# Patient Record
Sex: MALE | Race: WHITE | NOT HISPANIC OR LATINO | Employment: FULL TIME | URBAN - METROPOLITAN AREA
[De-identification: names, ages, dates, MRNs, and addresses within clinical notes are randomized per-mention and may not be internally consistent; named-entity substitution may affect disease eponyms.]

---
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
## 2021-05-28 NOTE — PROGRESS NOTES
[facility] Well Child Check    ASSESSMENT & PLAN  Michael Parsons is a 18  y.o. 9  m.o. who has normal growth and normal development.    Diagnoses and all orders for this visit:    Encounter for routine child health examination without abnormal findings  -     Hearing Screening  - Does feel that things are going well. I did do a physical that would work for his upcoming [employer] camp, ok for them to just drop the forms off when available.     Attention deficit hyperactivity disorder (ADHD), predominantly inattentive type    Doing well generally, does not feel that the medication was helping, feels comfortable without medication at this time.   Will let me know if they want to have a refill of this, if restarting then we'll need to have him follow up for a controlled substance agreement.       Return to clinic in 1 year for a Well Child Check or sooner as needed    IMMUNIZATIONS/LABS  Immunizations were reviewed and orders were placed as appropriate.    REFERRALS  Dental:  Recommend routine dental care as appropriate.  Other:  No additional referrals were made at this time.    ANTICIPATORY GUIDANCE  Social:  Friends, Peer Pressure, Employment, Need for Privacy, Extracurricular Activities and Changes and Choices  Parenting:  Support, [place]/Dependence, Allowance, Homework, Family Time and Confidential Health Care  Nutrition:  Junk Food, Dieting and Body Image  Play and Communication:  Organized Sports, Appropriate Use of TV, Hobbies, Creative Talents and Read Books  Health:  Acne, Self-image building, Drugs, Smoking, Alcohol, Self Testicular Exam, Activity (>45 min/day), Sleep and Sun Screen  Safety:  Seat Belts, Swimming Safety, Contact Sports, Recreational vehicles, Bike/Motorcycle Helmets and Safe storage of Weapons  Sexuality:  Body Changes    HEALTH HISTORY  Do you have any concerns that you'd like to discuss today?: No concerns      He is doing well generally. He is working on his [employer]  honor for  [employer]. He is doing an adventure camp this summer in [place].       Accompanied by Father    Refills needed? No    Do you have any forms that need to be filled out? No        Do you have any significant health concerns in your family history?: No  Family History   Problem Relation Age of Onset     Gestational diabetes Mother      Hypertension Father      ADD / ADHD Sister      Since your last visit, have there been any major changes in your family, such as a move, job change, separation, divorce, or death in the family?: No  Has a lack of transportation kept you from medical appointments?: No    Home  Who lives in your home?:  Patient lives with his mother, father and two sisters.   Social History     Social History Narrative    Lives with mother Matthew, Father Lawrence, Sisters Aileen, Emanuel, Sonia and Vilma     Do you have any concerns about losing your housing?: No  Is your housing safe and comfortable?: Yes  Do you have any trouble with sleep?:  No    Education  What school do you child attend?:  [employer]   What grade are you in?:  11th  How do you perform in school (grades, behavior, attention, homework?: No concerns, patient performs well.     Eating  Do you eat regular meals including fruits and vegetables?:  yes  What are you drinking (cow's milk, water, soda, juice, sports drinks, energy drinks, etc)?: water and soda  Have you been worried that you don't have enough food?: No  Do you have concerns about your body or appearance?:  No    Activities  Do you have friends?:  yes  Do you get at least one hour of physical activity per day?:  yes  How many hours a day are you in front of a screen other than for schoolwork (computer, TV, phone)?:  2 - 3 hours   What do you do for exercise?: Patient enjoys biking and working out.   Do you have interest/participate in community activities/volunteers/school sports?:  yes, trap and swimming. Last Sports physical June 2017    MENTAL HEALTH  "SCREENING  PHQ-2 Total Score: 0 (2019  2:07 PM)    PHQ-9 Total Score: 0 (2019  2:07 PM)      VISION/HEARING  Vision: Patient is already followed by a vision specialist  Hearing:  Completed. See Results     Hearing Screening    125Hz 250Hz 500Hz 1000Hz 2000Hz 3000Hz 4000Hz 6000Hz 8000Hz   Right ear:   25 20 20  20 25    Left ear:   20 20 20  20 20        TB Risk Assessment:  The patient and/or parent/guardian answer positive to:  patient and/or parent/guardian answer 'no' to all screening TB questions    Dyslipidemia Risk Screening  Have either of your parents or any of your grandparents had a stroke or heart attack before age 55?: No  Any parents with high cholesterol or currently taking medications to treat?: No     Dental  When was the last time you saw the dentist?: 6-12 months ago   Parent/Guardian declines the fluoride varnish application today. Fluoride not applied today.    Patient Active Problem List   Diagnosis     Immunizations Delinquent     Attention deficit hyperactivity disorder (ADHD)     Controlled substance agreement signed       Drugs  Does the patient use tobacco/alcohol/drugs?:  no    Safety  Does the patient have any safety concerns (peer or home)?:  no  Does the patient use safety belts, helmets and other safety equipment?:  yes    Sex  Have you ever had sex?:  No    MEASUREMENTS  Height:  5' 9\" (1.753 m)  Weight: 151 lb 11.2 oz (68.8 kg)  BMI: Body mass index is 22.4 kg/m .  Blood Pressure: 110/70  Blood pressure percentiles are 21 % systolic and 54 % diastolic based on the 2017 AAP Clinical Practice Guideline. Blood pressure percentile targets: 90: 132/82, 95: 137/85, 95 + 12 mmH/97.    PHYSICAL EXAM  Physical Exam  General: Awake, Alert and Cooperative   Head: Normocephalic and Atraumatic   Eyes: PERRL and EOMI   ENT: Normal pearly TMs bilaterally and Oropharynx clear   Neck: Supple   Chest: Chest wall normal   Lungs: Clear to auscultation bilaterally   Heart:: Regular " rate and rhythm and no murmurs   Abdomen: Soft, nontender, nondistended and no hepatosplenomegaly   : deferred   Spine: Spine straight without curvature noted   Musculoskeletal: Moving all extremities, Normal tone and Full range of motion of the extremities   Neuro: Alert and oriented times 3, Normal tone in upper and lower extremities and Grossly normal   Skin: No rashes or lesions noted

## 2021-05-29 NOTE — TELEPHONE ENCOUNTER
Who is calling:  The patient's mom  Reason for Call:  The patient's mom is calling to check the status of the form request. The patient's mom would like a phone call back when it has been faxed.   Date of last appointment with primary care: 4/23/2019  Okay to leave a detailed message: No

## 2021-05-29 NOTE — TELEPHONE ENCOUNTER
Name of form/paperwork: Other:  Physical form for camp-  Have you been seen for this request: Yes:  4/23/19  Do we have the form: faxed today to clinic  When is form needed by: 6/11/19  How would you like the form returned: call mom when complete - she will     Patient Notified form requests are processed in 3-5 business days: Yes  (If patient needs form sooner, please note that in this message.)  Okay to leave a detailed message? Yes    Need completed by WED 6/12/19

## 2021-05-29 NOTE — TELEPHONE ENCOUNTER
Patient Returning Call  Reason for call:  form  Information relayed to patient:  Please fax to number on form.  Mother does not need hard copy. .  Please call if questions. Fax to [phone]  Patient has additional questions:  No  If YES, what are your questions/concerns:  No  Okay to leave a detailed message?: No call back needed

## 2021-05-31 VITALS — BODY MASS INDEX: 20.14 KG/M2 | WEIGHT: 136 LBS | HEIGHT: 69 IN

## 2021-05-31 VITALS — WEIGHT: 142 LBS

## 2021-05-31 VITALS — WEIGHT: 152.6 LBS

## 2021-06-01 VITALS — WEIGHT: 148.6 LBS | HEIGHT: 69 IN | BODY MASS INDEX: 22.01 KG/M2

## 2021-06-02 VITALS — WEIGHT: 160.13 LBS | BODY MASS INDEX: 23.37 KG/M2

## 2021-06-03 VITALS — HEIGHT: 69 IN | BODY MASS INDEX: 22.47 KG/M2 | WEIGHT: 151.7 LBS

## 2021-06-04 VITALS
DIASTOLIC BLOOD PRESSURE: 64 MMHG | BODY MASS INDEX: 23.08 KG/M2 | OXYGEN SATURATION: 98 % | HEIGHT: 69 IN | WEIGHT: 155.8 LBS | HEART RATE: 58 BPM | SYSTOLIC BLOOD PRESSURE: 102 MMHG

## 2021-06-05 NOTE — PROGRESS NOTES
[facility] Well Child Check    ASSESSMENT & PLAN  Michael Parsons is a 19 y.o. who has normal growth and normal development.    Diagnoses and all orders for this visit:    Encounter for routine child health examination without abnormal findings    Routine sports physical exam    Cleared for sports participation as desired, form sent to scanning    Return to clinic in 1 year for a Well Child Check or sooner as needed    IMMUNIZATIONS/LABS  Immunizations were reviewed and orders were placed as appropriate.    REFERRALS  Dental:  Recommend routine dental care as appropriate.  Other:  No additional referrals were made at this time.    ANTICIPATORY GUIDANCE  Social:  Friends, Peer Pressure, Employment, Need for Privacy and Extracurricular Activities  Parenting:  Support, [place]/Dependence, Allowance, Homework, Chores, Family Time and Confidential Health Care  Nutrition:  Junk Food, Dieting and Body Image  Play and Communication:  Organized Sports, Appropriate Use of TV, Hobbies, Creative Talents and Read Books  Health:  Acne, Self-image building, Drugs, Smoking, Alcohol, Self Testicular Exam, Activity (>45 min/day), Sleep, Sun Screen and Dental Care  Safety:  Seat Belts, Swimming Safety, Contact Sports, Recreational vehicles, Bike/Motorcycle Helmets, Safe storage of Weapons and Outdoor Safety Avoiding Sun Exposure  Sexuality:  Body Changes, Safe Sex, STD's and Contraception    HEALTH HISTORY  Do you have any concerns that you'd like to discuss today?: No concerns      He is planning on going to [employer] in the fall. He is hoping to do something with automotive. He is going to start saving for school and apartments.       Accompanied by Mother    Refills needed? No    Do you have any forms that need to be filled out? No        Do you have any significant health concerns in your family history?: No  Family History   Problem Relation Age of Onset     Gestational diabetes Mother      Hypertension Father      ADD / ADHD  Sister      Since your last visit, have there been any major changes in your family, such as a move, job change, separation, divorce, or death in the family?: No  Has a lack of transportation kept you from medical appointments?: No    Home  Who lives in your home?:  Father, mother,   Social History     Social History Narrative    Lives with mother Matthew, Father Lawrence, Sisters Aileen, Emanuel, Sonia and Vilma     Do you have any concerns about losing your housing?: No  Is your housing safe and comfortable?: Yes  Do you have any trouble with sleep?:  No    Education  What school do you child attend?: [employer]   What grade are you in?:  12th  How do you perform in school (grades, behavior, attention, homework?: No concerns, performs well.      Eating  Do you eat regular meals including fruits and vegetables?:  yes  What are you drinking (cow's milk, water, soda, juice, sports drinks, energy drinks, etc)?: soda, energy drinks and coffee  Have you been worried that you don't have enough food?: No  Do you have concerns about your body or appearance?:  No    Activities  Do you have friends?:  yes  Do you get at least one hour of physical activity per day?:  yes  How many hours a day are you in front of a screen other than for schoolwork (computer, TV, phone)?:  2  What do you do for exercise?: Patient enjoys swimming.   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 Patient works as a  and .     VISION/HEARING  Vision: Patient is already followed by a vision specialist  Hearing:  Completed. See Results    No exam data present    MENTAL HEALTH SCREENING  Social-emotional & mental health screening: No screening tool used  No concerns    TB Risk Assessment:  The patient and/or parent/guardian answer positive to:  no known risk of TB    Dyslipidemia Risk Screening  Have either of your parents or any  "of your grandparents had a stroke or heart attack before age 55?: No  Any parents with high cholesterol or currently taking medications to treat?: No     Dental  When was the last time you saw the dentist?: 6-12 months ago   Parent/Guardian declines the fluoride varnish application today. Fluoride not applied today.    Patient Active Problem List   Diagnosis     Immunizations Delinquent     Attention deficit hyperactivity disorder (ADHD)     Controlled substance agreement signed       Drugs  Does the patient use tobacco/alcohol/drugs?:  no    Safety  Does the patient have any safety concerns (peer or home)?:  no  Does the patient use safety belts, helmets and other safety equipment?:  yes    Sex  Have you ever had sex?:  Yes    MEASUREMENTS  Height:  5' 9\" (1.753 m)  Weight: 155 lb 12.8 oz (70.7 kg)  BMI: Body mass index is 23.01 kg/m .  Blood Pressure: 102/64  Blood pressure percentiles are not available for patients who are 18 years or older.    PHYSICAL EXAM  Physical Exam   Well developed, well nourished, no acute distress.  HEENT: normocephalic/atraumatic, PERRLA/EOMI, TMs: Gray, normal light reflex, no nasal discharge.  Oral mucosa: no erythema/exudate  Neck: No LAD/masses/thyromegaly  Lungs: clear bilaterally  Heart: regular rate and rhythm, no murmurs/gallops/rubs, no heart murmur present on Valsalva  Abdomen: Normal bowel sounds, soft, non-tender, non-distended, no masses, neg Be's/McBurney's, no rebound/guarding  Genital: Bilaterally descended testes, no masses, non-tender, no hernia.  No urethral discharge or erythema.  No lesions, normal phallus.  Lymphatics: no supraclavicular/axillary/epitrochlear/inguinal LAD. No edema.  Neuro: A&O x 3, CN II-XII intact, strength 5/5, reflexes symmetric, sensory intact to light touch.  Psych: Behavior appropriate, engaging.  Thought processes congruent, non-tangential.  Musculoskeletal: no gross deformities.  Skin: no rashes or lesions.     "

## 2021-06-11 NOTE — PROGRESS NOTES
Chief Complaint   Patient presents with     Sore Throat     Started this morning.       HPI    Patient is here for moderate sore throat, started this AM, with a slight cough. No fever, ear pain, nasal symptoms. Took Tylenol early this AM with some relief. No known sick contacts.   ROS: Pertinent ROS noted in HPI.     No Known Allergies    Patient Active Problem List   Diagnosis     Immunizations Delinquent     Attention deficit hyperactivity disorder (ADHD)     Controlled substance agreement signed       Family History   Problem Relation Age of Onset     Diabetes Mother      pre-diabetes     Hypertension Father        Social History     Social History     Marital status: Single     Spouse name: N/A     Number of children: N/A     Years of education: N/A     Occupational History     Not on file.     Social History Main Topics     Smoking status: Never Smoker     Smokeless tobacco: Never Used      Comment: No exposure     Alcohol use Not on file     Drug use: Not on file     Sexual activity: Not on file     Other Topics Concern     Not on file     Social History Narrative         Objective:    Vitals:    06/10/17 1438   BP: 102/58   Pulse: 75   Resp: 18   Temp: 98.2  F (36.8  C)   SpO2: 98%       Gen: NAD  Throat: minimal tonsillar erythema without edema nor exudates  Ears: normal TMs and canals  Nose: no discharge  Neck; no adenopathy  CV: RRR, no M, R, G  Pulm: CTAB, normal effort      Recent Results (from the past 24 hour(s))   Rapid Strep A Screen-Throat   Result Value Ref Range    Rapid Strep A Antigen No Group A Strep detected, presumptive negative No Group A Strep detected, presumptive negative         Sore throat  -     Rapid Strep A Screen-Throat  -     Group A Strep, RNA Direct Detection, Throat      Supportive cares as directed.

## 2021-06-11 NOTE — PROGRESS NOTES
[facility] Well Child Check    ASSESSMENT & PLAN  Michael Parsons is a 16  y.o. 10  m.o. who has normal growth and normal development.    Diagnoses and all orders for this visit:    Well adult exam  -     Hemoglobin  -     Cancel: Urinalysis  -     Urinalysis Macroscopic      Return to clinic in 1 year for a Well Child Check or sooner as needed   Mom understands that Dr. Boggs is along with the clinic and that she will need to get Michael established with another 1 of the primary care physicians here and have controlled substance agreement signed for the issue of the Concerta for his known attention deficit hyperactivity disorder.  He does not take the medication in the summer except that when he is at camp.  She will plan to have him be seen before school starts.  I filled out both forms for his sports participation physical as well as for his high adventure [employer] form.    IMMUNIZATIONS/LABS  I have discussed the risks and benefits of all of the vaccine components with the patient/parents.  All questions have been answered. and I recommended HPV vaccine but mom declined at this time.    REFERRALS  Dental:  Recommend routine dental care as appropriate.  Other:  No additional referrals were made at this time.    ANTICIPATORY GUIDANCE  I have reviewed age appropriate anticipatory guidance.    HEALTH HISTORY  Do you have any concerns that you'd like to discuss today?: No concerns       Roomed by: Gen B., CMA    Refills needed? No    Do you have any forms that need to be filled out? No        Do you have any significant health concerns in your family history?: No  Family History   Problem Relation Age of Onset     Diabetes Mother      pre-diabetes     Hypertension Father      Since your last visit, have there been any major changes in your family, such as a move, job change, separation, divorce, or death in the family?: No    Home  Who lives in your home?:  Mom, dad, siblings  Social History     Social History  Narrative     No narrative on file     Do you have any trouble with sleep?:  No    Education  What school does your child attend?:  [employer]  What grade is your child in?:  10th  How does the patient perform in school (grades, behavior, attention, homework?: good     Eating  Does patient eat regular meals including fruits and vegetables?:  yes  What is the patient drinking (cow's milk, water, soda, juice, sports drinks, energy drinks, etc)?: water, soda and sports drinks  Does patient have concerns about body or appearance?:  No    Activities  Does the patient have friends?:  yes  Does the patient get at least one hour of physical activity per day?:  yes  Does the patient have less than 2 hours of screen time per day (aside from homework)?:  yes  What does your child do for exercise?:  Swimming, walking the dog, running, mowing the lawn, weight lifting  Does the patient have interest/participate in community activities/volunteers/school sports?:  Yes, volunteers for [employer], swimming & track    MENTAL HEALTH SCREENING  PHQ-2 Total Score: 0 (6/12/2017  1:00 PM)  PHQ-2 Total Score: 0 (6/12/2017  1:00 PM)    VISION/HEARING  Vision: Completed. See Results  Hearing:  Completed. See Results     Hearing Screening    Method: Audiometry    125Hz 250Hz 500Hz 1000Hz 2000Hz 3000Hz 4000Hz 6000Hz 8000Hz   Right ear:   20 20 20  20     Left ear:   25 25 20  20        Visual Acuity Screening    Right eye Left eye Both eyes   Without correction:      With correction: 20/25 20/25 20/25       TB Risk Assessment:  The patient and/or parent/guardian answer positive to:  patient and/or parent/guardian answer 'no' to all screening TB questions    Dental  Is your child being seen by a dentist?  Yes  Is child seen by dentist?     Yes    Patient Active Problem List   Diagnosis     Immunizations Delinquent     Attention deficit hyperactivity disorder (ADHD)     Controlled substance agreement signed       Drugs  Does the  "patient use tobacco/alcohol/drugs?:  no    Safety  Does the patient have any safety concerns (peer or home)?:  no  Does the patient use safety belts, helmets and other safety equipment?:  no    Sex  Is the patient sexually active?:  no    MEASUREMENTS  Height:  5' 8.5\" (1.74 m)  Weight: 136 lb (61.7 kg)  BMI: Body mass index is 20.38 kg/(m^2).  Blood Pressure: 100/70  Blood pressure percentiles are 6 % systolic and 64 % diastolic based on NHBPEP's 4th Report. Blood pressure percentile targets: 90: 130/81, 95: 134/85, 99 + 5 mmH/98.    PHYSICAL EXAM  Physical Exam   Head normocephalic.  External ears and TMs normal except for minimal clear fluid behind TMs consistent with eustachian tube function from his current viral upper respiratory infection.  Nasal congestion consistent with his URI.  Pupils equal round and react to light and accommodation.  Oropharynx negative.  Neck supple without adenopathy or thyromegaly.  Lungs clear to auscultation.  Heart regular rate and rhythm without murmur.  Abdomen shows no masses tenderness or hepatosplenomegaly.  Genitalia normal with tender stage V development, no hernia.  Extremities unremarkable.  No cervical axillary or groin adenopathy.  No focal neurologic abnormalities.    "

## 2021-06-14 NOTE — PROGRESS NOTES
Assessment/Plan:  1. Attention deficit hyperactivity disorder (ADHD), predominantly inattentive type  -Doing well on current medication. Will f/u in six months and call sooner if issues.   -Controlled substance agreement updated today  - methylphenidate HCl (CONCERTA) 36 MG CR tablet; Take 1 tablet (36 mg total) by mouth every morning.  Dispense: 30 tablet; Refill: 0    There are no Patient Instructions on file for this visit.    SUBJECTIVE:  Michael Parsons is a 17 y.o. male present to clinic with mother to follow up on ADHD.     Pt is currently on:    Current Outpatient Prescriptions:      methylphenidate HCl (CONCERTA) 36 MG CR tablet, Take 1 tablet (36 mg total) by mouth every morning., Disp: 30 tablet, Rfl: 0     UNABLE TO FIND, daily. Med Name: Protein  Drinks Mitoplex, Disp: , Rfl:     He was last seen on June 2017    His diagnosis was made on 2012    Possible Side Effects: none  Use on weekends and holidays: uses it only on busy weekends and holidays where he may have finals.   When takes medicine: takes it at around 0700  Time that is wears off: 7th period, maybe 2 pm    Current school and grade level: 10th grade  Special classes if any: Does not have an IEP or special classes.   Peer interactions: normal  Mood: fine  Sleep:sleeping well  Swallow pills:   Drug use: denies. The [place] Prescribing Monitoring program website was checked for this patient and did not show any concerning refills.    Other concerns or comments: na    Social history:   Social History     Social History Narrative     Lives at home with Mom, Dad and siblings  Family stressors na    Family history:   Family History   Problem Relation Age of Onset     Diabetes Mother      pre-diabetes     Hypertension Father        ADHD: Inattentive  Learning problems: None  Anxiety, Bipolar, depression: None  Tic's or tourette's: None  Hypertrophic cardiomyopathy, long Qtc and sudden death: None        Past Medical History:  Past Medical History:    Diagnosis Date     Attention-deficit Hyperactivity Disorder     Created by Conversion      Immunizations Delinquent     Created by [employer] [facility] Annotation: Nov 25 2012  5:39PM - Carissa Boggs: As of Jan 23, 2013 still needs HVP series.  2nd Hep A after July 23, 2013.  Mother has  refused influenza vaccine for him.      Past Surgical History:   Procedure Laterality Date     TOOTH EXTRACTION Right        Allergies: No Known Allergies    ROS:  General: Health is good  Endocrine: Growing in height and weight well.  Cardiac: No chest pain, palpitations, or syncope, No chest pain with exercise   GI: Good appetite, no stomachaches, no nausea, no diarrhea, no emesis, no constipation  : no enuresis  Neuro: No headaches, sleep disturbance, tics, changes in mood, no changes in activity level.     Exam:  Vitals:    11/28/17 1554   BP: 122/76   Pulse: 72   SpO2: 98%   Weight: 152 lb 9.6 oz (69.2 kg)       MENTAL STATUS:  Gen: Alert, oriented. Well groomed, interacts appropriately with examiner.    Speech:No abnormal speech or flight of ideas.   Mood: euthymic.    Thought content: No abnormal thought content.  Judgment: intact  Fund of knowledge: appropriate for age.  Neck: thyroid non enlarged  Cardiovascular Exam: RRR without murmurs, clicks or gallops.  Lung Exam: Clear and equal breath sounds.  Musculoskeletal Exam: Gross survey unremarkable. Gait smooth and coordinated.         Total of 25 minutes spent with patient, > 50% spent in counseling and/or coordination of care.     Pippa Botello ....................  11/30/2017   4:15 PM

## 2021-06-17 NOTE — PATIENT INSTRUCTIONS - HE
Patient Instructions by Ppipa Botello MD at 4/23/2019  2:00 PM     Author: Pippa Botello MD Service: -- Author Type: Physician    Filed: 4/23/2019  2:40 PM Encounter Date: 4/23/2019 Status: Signed    : Pippa Botello MD (Physician)         4/23/2019  Wt Readings from Last 1 Encounters:   04/23/19 151 lb 11.2 oz (68.8 kg) (58 %, Z= 0.19)*     * Growth percentiles are based on CDC (Boys, 2-20 Years) data.       Acetaminophen Dosing Instructions  (May take every 4-6 hours)      WEIGHT   AGE Infant/Children's  160mg/5ml Children's   Chewable Tabs  80 mg each Simon Strength  Chewable Tabs  160 mg     Milliliter (ml) Soft Chew Tabs Chewable Tabs   6-11 lbs 0-3 months 1.25 ml     12-17 lbs 4-11 months 2.5 ml     18-23 lbs 12-23 months 3.75 ml     24-35 lbs 2-3 years 5 ml 2 tabs    36-47 lbs 4-5 years 7.5 ml 3 tabs    48-59 lbs 6-8 years 10 ml 4 tabs 2 tabs   60-71 lbs 9-10 years 12.5 ml 5 tabs 2.5 tabs   72-95 lbs 11 years 15 ml 6 tabs 3 tabs   96 lbs and over 12 years   4 tabs     Ibuprofen Dosing Instructions- Liquid  (May take every 6-8 hours)      WEIGHT   AGE Concentrated Drops   50 mg/1.25 ml Infant/Children's   100 mg/5ml     Dropperful Milliliter (ml)   12-17 lbs 6- 11 months 1 (1.25 ml)    18-23 lbs 12-23 months 1 1/2 (1.875 ml)    24-35 lbs 2-3 years  5 ml   36-47 lbs 4-5 years  7.5 ml   48-59 lbs 6-8 years  10 ml   60-71 lbs 9-10 years  12.5 ml   72-95 lbs 11 years  15 ml       Ibuprofen Dosing Instructions- Tablets/Caplets  (May take every 6-8 hours)    WEIGHT AGE Children's   Chewable Tabs   50 mg Simon Strength   Chewable Tabs   100 mg Simon Strength   Caplets    100 mg     Tablet Tablet Caplet   24-35 lbs 2-3 years 2 tabs     36-47 lbs 4-5 years 3 tabs     48-59 lbs 6-8 years 4 tabs 2 tabs 2 caps   60-71 lbs 9-10 years 5 tabs 2.5 tabs 2.5 caps   72-95 lbs 11 years 6 tabs 3 tabs 3 caps         Patient Education             Bright Futures Parent Handout   15 to  17 Year Visits  Here are some suggestions from [employer] experts that may be of value to your family.     Your Growing and Changing Teen    Help your teen visit the dentist at least twice a year.    Encourage your teen to protect her hearing at work, home, and concerts.    Keep a variety of healthy foods at home.    Help your teen get enough calcium.    Encourage 1 hour of vigorous physical activity a day.    Praise your teen when he does something well, not just when he looks good.  Healthy Behavior Choices    Talk with your teen about your values and your expectations on drinking, drug use, tobacco use, driving, and sex.    Be there for your teen when she needs support or help in making healthy decision about her sexual behavior.    Support safe activities at school and in the community.    Praise your teen for healthy decisions about sex, tobacco, alcohol, and other drugs. Violence and Injuries    Do not tolerate drinking and driving.    Insist that seat belts be used by everyone.    Set expectations for safe driving.    Limit the number of friends in the car, nighttime driving, and distractions.    Never allow physical harm of yourself, your teen, or others at home or school.    Remove guns from your home. If you must keep a gun in your home, make sure it is unloaded and locked with ammunition locked in a separate place.    Teach your teen how to deal with conflict without using violence.    Make sure your teen understands that healthy dating relationships are built on respect and that saying no is OK.  Feelings and Family    Set aside time to be with your teen and really listen to his hopes and concerns.    Support your teen as he figures out ways to deal with stress.    Support your teen in solving problems and making decisions.    If you are concerned that your teen is sad, depressed, nervous, irritable, hopeless, or angry, talk with me. School and Friends    Praise positive efforts and success in school  and other activities.    Encourage reading.    Help your teen find new activities she enjoys.    Encourage your teen to help others in the community.    Help your teen find and be a part of positive after-school activities and sports.    Encourage healthy friendships and fun, safe things to do with friends.    Know your teens friends and their parents, where your teen is, and what he is doing at all times.    Check in with your teens teacher about her grades on tests.    Attend back-to-school events if possible.    Attend parent-teacher conferences if possible.            Patient Education             [facility] Patient Handout   15 to 17 Year Visits     Your Daily Life    Visit the dentist at least twice a year.    Brush your teeth at least twice a day and floss once a day.    Wear your mouth guard when playing sports.    Protect your hearing at work, home, and concerts.    Try to eat healthy foods.    5 fruits and vegetables a day    3 cups of low-fat milk, yogurt, or cheese    Eating breakfast is very important.    Drink plenty of water. Choose water instead of soda.    Eat with your family often.    Aim for 1 hour of vigorous physical activity every day.    Try to limit watching TV, playing video games, or playing on the computer to 2 hours a day (outside of homework time).    Be proud of yourself when you do something good.  Healthy Behavior Choices    Talk with your parents about your values and expectations for drinking, drug use, tobacco use, driving, and sex.    Talk with your parents when you need support or help in making healthy decisions about sex.    Find safe activities at school and in the community.    Make healthy decisions about sex, tobacco, alcohol, and other drugs.    Follow your familys rules. Violence and Injuries    Do not drink and drive or ride in a vehicle with someone who has been using drugs or alcohol.    If you feel unsafe driving or riding with someone, call someone you trust to  drive you.    Support friends who choose not to use tobacco, alcohol, drugs, steroids, or diet pills.    Insist that seat belts be used by everyone.    Always be a safe and cautious .    Limit the number of friends in the car, nighttime driving, and distractions.    Never allow physical harm of yourself or others at home or school.    Learn how to deal with conflict without using violence.    Understand that healthy dating relationships are built on respect and that saying no is OK.    Fighting and carrying weapons can be dangerous.  Your Feelings    Talk with your parents about your hopes and concerns.    Figure out healthy ways to deal with stress.    Look for ways you can help out at home.    Develop ways to solve problems and make good decisions.    Its important for you to have accurate information about sexuality, your physical development, and your sexual feelings. Please ask me if you have any questions. School and Friends    Set high goals for yourself in school, your future, and other activities.    Read often.    Ask for help when you need it.    Find new activities you enjoy.    Consider volunteering and helping others in the community with an issue that interests or concerns you.    Be a part of positive after-school activities and sports.    Form healthy friendships and find fun, safe things to do with friends.    Spend time with your family and help at home.    Take responsibility for getting your homework done and getting to school or work on time.

## 2021-06-18 NOTE — PROGRESS NOTES
[facility] Well Child Check    ASSESSMENT & PLAN  Michael Parsons is a 17  y.o. 10  m.o. who has normal growth and normal development.    Diagnoses and all orders for this visit:    Routine general medical examination at a health care facility  -     Meningococcal MCV4P  -     Cancel: Hearing Screening  - Doing well. He did have a boy  camp coming up and I did fill out that paperwork as well, both for regular and high adventure camp. He has no issues with exercise. Sports physical is good until 2019.   - Mom defers HPV vaccine at this time    Attention deficit hyperactivity disorder (ADHD), predominantly inattentive type  -     methylphenidate HCl (CONCERTA) 36 MG CR tablet; Take 1 tablet (36 mg total) by mouth every morning.  Dispense: 15 tablet; Refill: 0  - Doing well, f/u in six months, filled today and updated controlled substance agreement    Controlled substance agreement signed      Return to clinic in 1 year for a Well Child Check or sooner as needed    IMMUNIZATIONS/LABS  Immunizations were reviewed and orders were placed as appropriate.    REFERRALS  Dental:  Recommend routine dental care as appropriate.  Other:  No additional referrals were made at this time.    ANTICIPATORY GUIDANCE  Social:  Friends, Peer Pressure, Employment and Need for Privacy  Parenting:  Support, [place]/Dependence, Allowance, Homework, Family Time and Confidential Health Care  Nutrition:  Junk Food and Dieting  Play and Communication:  Organized Sports, Appropriate Use of TV, Hobbies, Creative Talents and Read Books  Health:  Acne, Smoking, Activity (>45 min/day), Sleep, Sun Screen and Dental Care  Safety:  Seat Belts, Swimming Safety, Safe storage of Weapons and Outdoor Safety Avoiding Sun Exposure  Sexuality:  Body Changes    HEALTH HISTORY  Do you have any concerns that you'd like to discuss today?: No concerns      He does need a refill of his concerta. It is working well for him. He did have a sports physical in  2016. Does not get dizzy with exercise.       Accompanied by Mother    Refills needed? Yes Concerta    Do you have any forms that need to be filled out? Yes [employer] forms        Do you have any significant health concerns in your family history?: No  Family History   Problem Relation Age of Onset     Diabetes Mother      pre-diabetes     Hypertension Father      Since your last visit, have there been any major changes in your family, such as a move, job change, separation, divorce, or death in the family?: No  Has a lack of transportation kept you from medical appointments?: No    Home  Who lives in your home?:  Patient lives with his father, mother and four sisters.   Social History     Social History Narrative    Lives with mother Matthew, Father Lawrence, Sisters Aileen, Emanuel, Sonia and Vilma     Do you have any concerns about losing your housing?: No  Is your housing safe and comfortable?: Yes  Do you have any trouble with sleep?:  No    Education  What school do you child attend?: [employer]   What grade are you in?:  11th (Starting in the fall).   How do you perform in school (grades, behavior, attention, homework?: No concerns, performs well.     Eating  Do you eat regular meals including fruits and vegetables?:  yes, not on a regular basis.   What are you drinking (cow's milk, water, soda, juice, sports drinks, energy drinks, etc)?: water, soda, juice and tea.  Have you been worried that you don't have enough food?: No  Do you have concerns about your body or appearance?:  No    Activities  Do you have friends?:  yes  Do you get at least one hour of physical activity per day?:  yes  How many hours a day are you in front of a screen other than for schoolwork (computer, TV, phone)?:  2  What do you do for exercise?: Swimming, biking and walking.   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     MENTAL HEALTH  "SCREENING  PHQ-2 Total Score: 0 (2018  7:24 AM)  PHQ-9 Total Score: 0 (2018  7:24 AM)    VISION/HEARING  Vision: Patient is already followed by a vision specialist  Hearing:  Completed. See Results    No exam data present    TB Risk Assessment:  The patient and/or parent/guardian answer positive to:  patient and/or parent/guardian answer 'no' to all screening TB questions    Dyslipidemia Risk Screening  Have either of your parents or any of your grandparents had a stroke or heart attack before age 55?: No  Any parents with high cholesterol or currently taking medications to treat?: No     Dental  When was the last time you saw the dentist?: 6-12 months ago   Parent/Guardian declines the fluoride varnish application today.    Patient Active Problem List   Diagnosis     Immunizations Delinquent     Attention deficit hyperactivity disorder (ADHD)     Controlled substance agreement signed       Drugs  Does the patient use tobacco/alcohol/drugs?:  no    Safety  Does the patient have any safety concerns (peer or home)?:  no  Does the patient use safety belts, helmets and other safety equipment?:  yes    Sex  Have you ever had sex?:  No    MEASUREMENTS  Height:  5' 9.4\" (1.763 m)  Weight: 148 lb 9.6 oz (67.4 kg)  BMI: Body mass index is 21.69 kg/(m^2).  Blood Pressure: 108/66  Blood pressure percentiles are 15 % systolic and 44 % diastolic based on NHBPEP's 4th Report. Blood pressure percentile targets: 90: 133/83, 95: 137/87, 99 + 5 mmH/100.    PHYSICAL EXAM  Physical Exam   Well developed, well nourished, no acute distress.  HEENT: normocephalic/atraumatic, PERRLA/EOMI, TMs: Gray, normal light reflex, no nasal discharge.  Oral mucosa: no erythema/exudate  Neck: No LAD/masses/thyromegaly  Lungs: clear bilaterally  Heart: regular rate and rhythm, no murmurs/gallops/rubs  Abdomen: Normal bowel sounds, soft, non-tender, non-distended, no masses, neg Be's/McBurney's, no rebound/guarding  Genital: " Bilaterally descended testes, no masses, non-tender, no hernia.    Lymphatics: no supraclavicular/axillary/epitrochlear/inguinal LAD. No edema.  Neuro: A&O x 3, CN II-XII intact, strength 5/5, reflexes symmetric, sensory intact to light touch.  Psych: Behavior appropriate, engaging.  Thought processes congruent, non-tangential.  Musculoskeletal: no gross deformities.  Skin: no rashes or lesions.

## 2021-06-18 NOTE — PATIENT INSTRUCTIONS - HE
Patient Instructions by Pippa Botello MD at 2/3/2020  9:40 AM     Author: Pippa Botello MD Service: -- Author Type: Physician    Filed: 2/3/2020 10:16 AM Encounter Date: 2/3/2020 Status: Signed    : Pippa Botello MD (Physician)          Patient Education      [facility]IGHT [facility] HANDOUT- PATIENT  18 THROUGH 21 YEAR VISITS  Here are some suggestions from [employer] experts that may be of value to your family.     HOW YOU ARE DOING  Enjoy spending time with your family.  Find activities you are really interested in, such as sports, theater, or volunteering.  Try to be responsible for your schoolwork or work obligations.  Always talk through problems and never use violence.  If you get angry with someone, try to walk away.  If you feel unsafe in your home or have been hurt by someone, let us know. Hotlines and community agencies can also provide confidential help.  Talk with us if you are worried about your living or food situation. Community agencies and programs such as SNAP can help.  Dont smoke, vape, or use drugs. Avoid people who do when you can. Talk with us if you are worried about alcohol or drug use in your family.    YOUR DAILY LIFE  Visit the dentist at least twice a year.  Brush your teeth at least twice a day and floss once a day.  Be a healthy eater.  Have vegetables, fruits, lean protein, and whole grains at meals and snacks.  Limit fatty, sugary, salty foods that are low in nutrients, such as candy, chips, and ice cream.  Eat when youre hungry. Stop when you feel satisfied.  Eat breakfast.  Drink plenty of water.  Make sure to get enough calcium every day.  Have 3 or more servings of low-fat (1%) or fat-free milk and other low-fat dairy products, such as yogurt and cheese.  Women: Make sure to eat foods rich in folate, such as fortified grains and dark- green leafy vegetables.  Aim for at least 1 hour of physical activity every day.  Wear safety equipment  when you play sports.  Get enough sleep.  Talk with us about managing your health care and insurance as an adult.    YOUR FEELINGS  Most people have ups and downs. If you are feeling sad, depressed, nervous, irritable, hopeless, or angry, let us know or reach out to another health care professional.  Figure out healthy ways to deal with stress.  Try your best to solve problems and make decisions on your own.  Sexuality is an important part of your life. If you have any questions or concerns, we are here for you.    HEALTHY BEHAVIOR CHOICES  Avoid using drugs, alcohol, tobacco, steroids, and diet pills. Support friends who choose not to use.  If you use drugs or alcohol, let us know or talk with another trusted adult about it. We can help you with quitting or cutting down on your use.  Make healthy decisions about your sexual behavior.  If you are sexually active, always practice safe sex. Always use birth control along with a condom to prevent pregnancy and sexually transmitted infections.  All sexual activity should be something you want. No one should ever force or try to convince you.  Protect your hearing at work, home, and concerts. Keep your earbud volume down.    STAYING SAFE  Always be a safe and cautious .  Insist that everyone use a lap and shoulder seat belt.  Limit the number of friends in the car and avoid driving at night.  Avoid distractions. Never text or talk on the phone while you drive.  Do not ride in a vehicle with someone who has been using drugs or alcohol.  If you feel unsafe driving or riding with someone, call someone you trust to drive you.  Wear helmets and protective gear while playing sports. Wear a helmet when riding a bike, a motorcycle, or an ATV or when skiing or skateboarding.  Always use sunscreen and a hat when youre outside.  Fighting and carrying weapons can be dangerous. Talk with your parents, teachers, or doctor about how to avoid these situations.      Helpful  Resources:  National Domestic Violence Hotline: 284.989.6588   Consistent with Bright Futures: Guidelines for Health Supervision of Infants, Children, and Adolescents, 4th Edition  For more information, go to https://brightfutures.aap.org.

## 2021-06-20 NOTE — LETTER
Letter by Pippa Botello MD at      Author: Pippa Botello MD Service: -- Author Type: --    Filed:  Encounter Date: 2/3/2020 Status: (Other)         February 3, 2020     Patient: Michael Parsons   YOB: 2000   Date of Visit: 2/3/2020       To Whom it May Concern:    Michael Parsons was seen in my clinic on 2/3/2020. He will be late to school due to this.     If you have any questions or concerns, please don't hesitate to call.    Sincerely,         Electronically signed by Pippa Botello MD

## 2021-06-24 NOTE — PROGRESS NOTES
ASSESSMENT/PLAN:       1. Sore throat    - Rapid Strep A Screen- Throat Swab, negative  - Group A Strep, RNA Direct Detection, Throat    2. Acute suppurative otitis media of right ear without spontaneous rupture of tympanic membrane, recurrence not specified    - amoxicillin (AMOXIL) 875 MG tablet; Take 1 tablet (875 mg total) by mouth 2 (two) times a day for 7 days.  Dispense: 14 tablet; Refill: 0    3. Fever, unspecified fever cause  I would hope that the patient is afebrile after 24-48 hours and feeling better.  If new symptoms developed or if getting worse she will need to return.  Symptoms could be related to influenza but he does not have any cough.          Robert Hernandez MD      PROGRESS NOTE   3/4/2019    SUBJECTIVE:  Michael Parsons is a 18 y.o. male  who presents for   Chief Complaint   Patient presents with     Headache     fatigue x 3 days      2 days with headache, muscle aching and some fever.  Also has noticed some discomfort in the neck on the right side.  He denies sore throat and earache.  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  Of note is that he denies any significant ear pain or hearing loss    Patient Active Problem List   Diagnosis     Immunizations Delinquent     Attention deficit hyperactivity disorder (ADHD)     Controlled substance agreement signed       Current Outpatient Medications   Medication Sig Dispense Refill     methylphenidate HCl (CONCERTA) 36 MG CR tablet Take 1 tablet (36 mg total) by mouth every morning. 30 tablet 0     UNABLE TO FIND daily. Med Name: Protein  Drinks Mitoplex       amoxicillin (AMOXIL) 875 MG tablet Take 1 tablet (875 mg total) by mouth 2 (two) times a day for 7 days. 14 tablet 0     No current facility-administered medications for this visit.        Social History     Tobacco Use   Smoking Status Never Smoker   Smokeless Tobacco Never Used   Tobacco Comment    No exposure           OBJECTIVE:        Recent Results  (from the past 240 hour(s))   Rapid Strep A Screen- Throat Swab   Result Value Ref Range    Rapid Strep A Antigen No Group A Strep detected, presumptive negative No Group A Strep detected, presumptive negative       Vitals:    03/04/19 1043   BP: 110/58   Patient Position: Sitting   Cuff Size: Adult Large   Pulse: 91   Temp: 102.5  F (39.2  C)   SpO2: 99%   Weight: 160 lb 2 oz (72.6 kg)     Weight: 160 lb 2 oz (72.6 kg)          Physical Exam:  GENERAL APPEARANCE: 18-year-old who appears ill, well hydrated, well nourished  SKIN:  Normal skin turgor, no lesions/rashes   HEENT: moist mucous membranes, no rhinorrhea, the right tympanic membrane is erythematous with loss of landmarks and the left tympanic membrane is normal.  Pharynx is normal  NECK: Normal with 2+ anterior cervical adenopathy right side or masses  CV: RRR, no M/G/R   LUNGS: CTAB  ABDOMEN: S&NT, no masses or enlarged organs   EXTREMITY: no edema and full ROM of all joints  NEURO: no focal findings

## 2024-05-17 ENCOUNTER — OFFICE VISIT (OUTPATIENT)
Dept: FAMILY MEDICINE | Facility: CLINIC | Age: 24
End: 2024-05-17
Payer: COMMERCIAL

## 2024-05-17 VITALS
HEART RATE: 94 BPM | BODY MASS INDEX: 20.59 KG/M2 | OXYGEN SATURATION: 99 % | DIASTOLIC BLOOD PRESSURE: 78 MMHG | SYSTOLIC BLOOD PRESSURE: 118 MMHG | HEIGHT: 69 IN | WEIGHT: 139 LBS | TEMPERATURE: 98.4 F | RESPIRATION RATE: 18 BRPM

## 2024-05-17 DIAGNOSIS — Z00.00 ROUTINE GENERAL MEDICAL EXAMINATION AT A HEALTH CARE FACILITY: Primary | ICD-10-CM

## 2024-05-17 DIAGNOSIS — Z11.3 SCREENING EXAMINATION FOR STI: ICD-10-CM

## 2024-05-17 DIAGNOSIS — Z13.0 SCREENING FOR DEFICIENCY ANEMIA: ICD-10-CM

## 2024-05-17 DIAGNOSIS — Z11.4 SCREENING FOR HIV (HUMAN IMMUNODEFICIENCY VIRUS): ICD-10-CM

## 2024-05-17 DIAGNOSIS — Z11.59 NEED FOR HEPATITIS C SCREENING TEST: ICD-10-CM

## 2024-05-17 LAB
ERYTHROCYTE [DISTWIDTH] IN BLOOD BY AUTOMATED COUNT: 11.9 % (ref 10–15)
HCT VFR BLD AUTO: 42.5 % (ref 40–53)
HGB BLD-MCNC: 15.3 G/DL (ref 13.3–17.7)
MCH RBC QN AUTO: 30.8 PG (ref 26.5–33)
MCHC RBC AUTO-ENTMCNC: 36 G/DL (ref 31.5–36.5)
MCV RBC AUTO: 86 FL (ref 78–100)
PLATELET # BLD AUTO: 258 10E3/UL (ref 150–450)
RBC # BLD AUTO: 4.96 10E6/UL (ref 4.4–5.9)
WBC # BLD AUTO: 6 10E3/UL (ref 4–11)

## 2024-05-17 PROCEDURE — 90715 TDAP VACCINE 7 YRS/> IM: CPT | Performed by: NURSE PRACTITIONER

## 2024-05-17 PROCEDURE — 36415 COLL VENOUS BLD VENIPUNCTURE: CPT | Performed by: NURSE PRACTITIONER

## 2024-05-17 PROCEDURE — 99385 PREV VISIT NEW AGE 18-39: CPT | Mod: 25 | Performed by: NURSE PRACTITIONER

## 2024-05-17 PROCEDURE — 87491 CHLMYD TRACH DNA AMP PROBE: CPT | Performed by: NURSE PRACTITIONER

## 2024-05-17 PROCEDURE — 86803 HEPATITIS C AB TEST: CPT | Performed by: NURSE PRACTITIONER

## 2024-05-17 PROCEDURE — 90651 9VHPV VACCINE 2/3 DOSE IM: CPT | Performed by: NURSE PRACTITIONER

## 2024-05-17 PROCEDURE — 85027 COMPLETE CBC AUTOMATED: CPT | Performed by: NURSE PRACTITIONER

## 2024-05-17 PROCEDURE — 90472 IMMUNIZATION ADMIN EACH ADD: CPT | Performed by: NURSE PRACTITIONER

## 2024-05-17 PROCEDURE — 87591 N.GONORRHOEAE DNA AMP PROB: CPT | Performed by: NURSE PRACTITIONER

## 2024-05-17 PROCEDURE — 87389 HIV-1 AG W/HIV-1&-2 AB AG IA: CPT | Performed by: NURSE PRACTITIONER

## 2024-05-17 PROCEDURE — 90471 IMMUNIZATION ADMIN: CPT | Performed by: NURSE PRACTITIONER

## 2024-05-17 SDOH — HEALTH STABILITY: PHYSICAL HEALTH: ON AVERAGE, HOW MANY DAYS PER WEEK DO YOU ENGAGE IN MODERATE TO STRENUOUS EXERCISE (LIKE A BRISK WALK)?: 5 DAYS

## 2024-05-17 SDOH — HEALTH STABILITY: PHYSICAL HEALTH: ON AVERAGE, HOW MANY MINUTES DO YOU ENGAGE IN EXERCISE AT THIS LEVEL?: 150+ MIN

## 2024-05-17 ASSESSMENT — SOCIAL DETERMINANTS OF HEALTH (SDOH): HOW OFTEN DO YOU GET TOGETHER WITH FRIENDS OR RELATIVES?: THREE TIMES A WEEK

## 2024-05-17 NOTE — PATIENT INSTRUCTIONS
"Preventive Care Advice   This is general advice we often give to help people stay healthy. Your care team may have specific advice just for you. Please talk to your care team about your own preventive care needs.  Lifestyle  Exercise at least 150 minutes each week (30 minutes a day, 5 days a week).  Do muscle strengthening activities 2 days a week. These help control your weight and prevent disease.  No smoking.  Wear sunscreen to prevent skin cancer.  Have your home tested for radon every 2 to 5 years. Radon is a colorless, odorless gas that can harm your lungs. To learn more, go to www.health.[facility].mn. and search for \"Radon in Homes.\"  Keep guns unloaded and locked up in a safe place like a safe or gun vault, or, use a gun lock and hide the keys. Always lock away bullets separately. To learn more, visit [employer].[place].gov and search for \"safe gun storage.\"  Nutrition  Eat 5 or more servings of fruits and vegetables each day.  Try wheat bread, brown rice and whole grain pasta (instead of white bread, rice, and pasta).  Get enough calcium and vitamin D. Check the label on foods and aim for 100% of the RDA (recommended daily allowance).  Regular exams  Have a dental exam and cleaning every 6 months.  See your health care team every year to talk about:  Any changes in your health.  Any medicines your care team has prescribed.  Preventive care, family planning, and ways to prevent chronic diseases.  Shots (vaccines)   HPV shots (up to age 26), if you've never had them before.  Hepatitis B shots (up to age 59), if you've never had them before.  COVID-19 shot: Get this shot when it's due.  Flu shot: Get a flu shot every year.  Tetanus shot: Get a tetanus shot every 10 years.  Pneumococcal, hepatitis A, and RSV shots: Ask your care team if you need these based on your risk.  Shingles shot (for age 50 and up).  General health tests  Diabetes screening:  Starting at age 35, Get screened for diabetes at least every 3 years.  If " you are younger than age 35, ask your care team if you should be screened for diabetes.  Cholesterol test: At age 39, start having a cholesterol test every 5 years, or more often if advised.  Bone density scan (DEXA): At age 50, ask your care team if you should have this scan for osteoporosis (brittle bones).  Hepatitis C: Get tested at least once in your life.  Abdominal aortic aneurysm screening: Talk to your doctor about having this screening if you:  Have ever smoked; and  Are biologically male; and  Are between the ages of 65 and 75.  STIs (sexually transmitted infections)  Before age 24: Ask your care team if you should be screened for STIs.  After age 24: Get screened for STIs if you're at risk. You are at risk for STIs (including HIV) if:  You are sexually active with more than one person.  You don't use condoms every time.  You or a partner was diagnosed with a sexually transmitted infection.  If you are at risk for HIV, ask about PrEP medicine to prevent HIV.  Get tested for HIV at least once in your life, whether you are at risk for HIV or not.  Cancer screening tests  Cervical cancer screening: If you have a cervix, begin getting regular cervical cancer screening tests at age 21. Most people who have regular screenings with normal results can stop after age 65. Talk about this with your provider.  Breast cancer scan (mammogram): If you've ever had breasts, begin having regular mammograms starting at age 40. This is a scan to check for breast cancer.  Colon cancer screening: It is important to start screening for colon cancer at age 45.  Have a colonoscopy test every 10 years (or more often if you're at risk) Or, ask your provider about stool tests like a FIT test every year or Cologuard test every 3 years.  To learn more about your testing options, visit: www.[employer]/631325.pdf.  For help making a decision, visit: paty/tm92362.  Prostate cancer screening test: If you have a prostate and are age 55  to 69, ask your provider if you would benefit from a yearly prostate cancer screening test.  Lung cancer screening: If you are a current or former smoker age 50 to 80, ask your care team if ongoing lung cancer screenings are right for you.  For informational purposes only. Not to replace the advice of your health care provider. Copyright   2023 [employer]. All rights reserved. Clinically reviewed by the [facility]. [employer] 564346 - REV 04/24.

## 2024-05-17 NOTE — PROGRESS NOTES
Preventive Care Visit  [facility]  CARLEY Alfaro CNP, Family Medicine  May 17, 2024    Michael was seen today for physical.    Diagnoses and all orders for this visit:    Routine general medical examination at a health care facility  -     REVIEW OF HEALTH MAINTENANCE PROTOCOL ORDERS  -     HPV9 (GARDASIL 9)  -     TDAP 10-64Y (ADACEL,BOOSTRIX)  -     PRIMARY CARE FOLLOW-UP SCHEDULING; Future    Screening for HIV (human immunodeficiency virus)  -     HIV Antigen Antibody Combo    Need for hepatitis C screening test  -     Hepatitis C Screen Reflex to HCV RNA Quant and Genotype    Screening examination for STI  -     NEISSERIA GONORRHOEA PCR  -     CHLAMYDIA TRACHOMATIS PCR    Screening for deficiency anemia  -     CBC with platelets      Return in about 1 year (around 5/17/2025) for Preventative Visit .      Subjective   Michael is a 23 year old, presenting for the following:  Physical        5/17/2024    10:34 AM   Additional Questions   Roomed by Monserrat YEN        Health Care Directive  Patient does not have a Health Care Directive or Living Will.      HPI          5/17/2024   General Health   How would you rate your overall physical health? Good   Feel stress (tense, anxious, or unable to sleep) Not at all         5/17/2024   Nutrition   Three or more servings of calcium each day? (!) NO   Diet: Regular (no restrictions)   How many servings of fruit and vegetables per day? (!) 0-1   How many sweetened beverages each day? (!) 4+         5/17/2024   Exercise   Days per week of moderate/strenous exercise 5 days   Average minutes spent exercising at this level 150+ min         5/17/2024   Social Factors   Frequency of gathering with friends or relatives Three times a week   Worry food won't last until get money to buy more Patient declined   Food not last or not have enough money for food? Patient declined   Do you have housing?  Patient declined   Are you worried about losing your  housing? Patient declined   Lack of transportation? Patient declined   Unable to get utilities (heat,electricity)? Patient declined         5/17/2024   Dental   Dentist two times every year? Yes         5/17/2024   TB Screening   Were you born outside of the [place]? No         Today's PHQ-2 Score:       5/17/2024    10:18 AM   PHQ-2 ( 1999 [employer])   Q1: Little interest or pleasure in doing things 0   Q2: Feeling down, depressed or hopeless 0   PHQ-2 Score 0   Q1: Little interest or pleasure in doing things Not at all   Q2: Feeling down, depressed or hopeless Not at all   PHQ-2 Score 0           5/17/2024   Substance Use   Alcohol more than 3/day or more than 7/wk No   Do you use any other substances recreationally? (!) CANNABIS PRODUCTS     Social History     Tobacco Use    Smoking status: Never    Smokeless tobacco: Never    Tobacco comments:     No exposure   Substance Use Topics    Alcohol use: No    Drug use: No         5/17/2024   One time HIV Screening   Previous HIV test? No         5/17/2024   STI Screening   New sexual partner(s) since last STI/HIV test? No         5/17/2024   Contraception/Family Planning   Questions about contraception or family planning No     Reviewed and updated as needed this visit by Provider                    BP Readings from Last 3 Encounters:   05/17/24 118/78   02/03/20 102/64    Wt Readings from Last 3 Encounters:   05/17/24 63 kg (139 lb)   02/03/20 70.7 kg (155 lb 12.8 oz) (58%, Z= 0.21)*   04/23/19 68.8 kg (151 lb 11.2 oz) (58%, Z= 0.19)*     * Growth percentiles are based on CDC (Boys, 2-20 Years) data.                  Patient Active Problem List   Diagnosis    Immunizations Delinquent    Attention deficit hyperactivity disorder (ADHD)    Controlled substance agreement signed     Past Surgical History:   Procedure Laterality Date    TOOTH EXTRACTION Right     WISDOM TOOTH EXTRACTION  2024       Social History     Tobacco Use    Smoking status: Never    Smokeless tobacco: Never  "   Tobacco comments:     No exposure   Substance Use Topics    Alcohol use: No     Family History   Problem Relation Age of Onset    Diabetes Mother     Gestational Diabetes Mother     Cancer Father     Hypertension Father     Attention Deficit Disorder Sister          No current outpatient medications on file.         Review of Systems  Constitutional, HEENT, cardiovascular, pulmonary, gi and gu systems are negative, except as otherwise noted.     Objective    Exam  /78   Pulse 94   Temp 98.4  F (36.9  C)   Resp 18   Ht 1.753 m (5' 9\")   Wt 63 kg (139 lb)   SpO2 99%   BMI 20.53 kg/m     Estimated body mass index is 20.53 kg/m  as calculated from the following:    Height as of this encounter: 1.753 m (5' 9\").    Weight as of this encounter: 63 kg (139 lb).    Physical Exam  GENERAL: alert and no distress  EYES: Eyes grossly normal to inspection  HENT: ear canals and TM's normal, nose and mouth without ulcers or lesions. S/p wisdom tooth extraction on 5/16/24, incisions intact  NECK: no adenopathy, no asymmetry, masses, or scars  RESP: lungs clear to auscultation - no rales, rhonchi or wheezes  CV: regular rate and rhythm, normal S1 S2, no S3 or S4, no murmur, click or rub, no peripheral edema  ABDOMEN: soft, nontender, no hepatosplenomegaly, no masses and bowel sounds normal  MS: no gross musculoskeletal defects noted, no edema  SKIN: no suspicious lesions or rashes  NEURO: Normal strength and tone, mentation intact and speech normal  PSYCH: mentation appears normal, affect normal/bright        Signed Electronically by: April Montes De Oca, CARLEY CNP    "

## 2024-05-17 NOTE — LETTER
May 20, 2024      Michael Parsons  [address] [address]        Dear ,    We are writing to inform you of your test results.    -Normal red blood cell (hgb) levels, normal white blood cell count and normal platelet levels.   -Hepatitis C antibody screen test shows no signs of a previous hepatitis C infection.   -HIV screening test is normal.   - Gonorrhea and Chlamydia screening tests were negative.        Resulted Orders   HIV Antigen Antibody Combo   Result Value Ref Range    HIV Antigen Antibody Combo Nonreactive Nonreactive      Comment:      Negative HIV-1 p24 antigen and HIV-1/2 antibody screening test results usually indicate the absence of HIV-1 and HIV-2 infection. However, such negative results do not rule-out acute HIV infection.  If acute HIV-1 or HIV-2 infection is suspected, detection of HIV-1 or HIV-2 RNA  is recommended.    Hepatitis C Screen Reflex to HCV RNA Quant and Genotype   Result Value Ref Range    Hepatitis C Antibody Nonreactive Nonreactive      Comment:      A nonreactive screening test result does not exclude the possibility of exposure to or infection with HCV. Nonreactive screening test results in individuals with prior exposure to HCV may be due to antibody levels below the limit of detection of this assay or lack of reactivity to the HCV antigens used in this assay. Patients with recent HCV infections (<3 months from time of exposure) may have false-negative HCV antibody results due to the time needed for seroconversion (average of 8 to 9 weeks).   CBC with platelets   Result Value Ref Range    WBC Count 6.0 4.0 - 11.0 10e3/uL    RBC Count 4.96 4.40 - 5.90 10e6/uL    Hemoglobin 15.3 13.3 - 17.7 g/dL    Hematocrit 42.5 40.0 - 53.0 %    MCV 86 78 - 100 fL    MCH 30.8 26.5 - 33.0 pg    MCHC 36.0 31.5 - 36.5 g/dL    RDW 11.9 10.0 - 15.0 %    Platelet Count 258 150 - 450 10e3/uL   NEISSERIA GONORRHOEA PCR   Result Value Ref Range    Neisseria gonorrhoeae  Negative Negative      Comment:      Negative for N. gonorrhoeae rRNA by transcription mediated amplification. A negative result by transcription mediated amplification does not preclude the presence of C. trachomatis infection because results are dependent on proper and adequate collection, absence of inhibitors and sufficient rRNA to be detected.   CHLAMYDIA TRACHOMATIS PCR   Result Value Ref Range    Chlamydia trachomatis Negative Negative      Comment:      A negative result by transcription mediated amplification does not preclude the presence of C. trachomatis infection because results are dependent on proper and adequate collection, absence of inhibitors and sufficient rRNA to be detected.       If you have any questions or concerns, please call the clinic at the number listed above.       Sincerely,        April Montes De Oca, APRN, CNP

## 2024-05-17 NOTE — PROGRESS NOTES
{PROVIDER CHARTING PREFERENCE:578309}    Dirk Rodriguez is a 23 year old, presenting for the following health issues:  No chief complaint on file.  {(!) Visit Details have not yet been documented.  Please enter Visit Details and then use this list to pull in documentation. (Optional):522524}  History of Present Illness       Reason for visit:  Phisical check up    He eats 0-1 servings of fruits and vegetables daily.He consumes 4 sweetened beverage(s) daily.He exercises with enough effort to increase his heart rate 10 to 19 minutes per day.  He exercises with enough effort to increase his heart rate 3 or less days per week.   He is taking medications regularly.       {MA/LPN/RN Pre-Provider Visit Orders- hCG/UA/Strep (Optional):026185}  {SUPERLIST (Optional):934531}  {additonal problems for provider to add (Optional):316061}    {ROS Picklists (Optional):941466}      Objective    There were no vitals taken for this visit.  There is no height or weight on file to calculate BMI.  Physical Exam   {Exam List (Optional):230038}    {Diagnostic Test Results (Optional):241120}        Signed Electronically by: CARLEY Alfaro CNP  {Email feedback regarding this note to [email or web address]   :[id number]}

## 2024-05-18 LAB
C TRACH DNA SPEC QL NAA+PROBE: NEGATIVE
HCV AB SERPL QL IA: NONREACTIVE
HIV 1+2 AB+HIV1 P24 AG SERPL QL IA: NONREACTIVE
N GONORRHOEA DNA SPEC QL NAA+PROBE: NEGATIVE